# Patient Record
Sex: MALE | Race: WHITE
[De-identification: names, ages, dates, MRNs, and addresses within clinical notes are randomized per-mention and may not be internally consistent; named-entity substitution may affect disease eponyms.]

---

## 2020-03-08 ENCOUNTER — HOSPITAL ENCOUNTER (EMERGENCY)
Dept: HOSPITAL 62 - ER | Age: 20
LOS: 1 days | Discharge: HOME | End: 2020-03-09
Payer: COMMERCIAL

## 2020-03-08 DIAGNOSIS — D72.829: ICD-10-CM

## 2020-03-08 DIAGNOSIS — R10.84: Primary | ICD-10-CM

## 2020-03-08 DIAGNOSIS — Z87.891: ICD-10-CM

## 2020-03-08 LAB
ADD MANUAL DIFF: NO
ALBUMIN SERPL-MCNC: 5.3 G/DL (ref 3.5–5)
ALP SERPL-CCNC: 108 U/L (ref 38–126)
ANION GAP SERPL CALC-SCNC: 12 MMOL/L (ref 5–19)
APPEARANCE UR: CLEAR
APTT PPP: YELLOW S
AST SERPL-CCNC: 39 U/L (ref 17–59)
BASOPHILS # BLD AUTO: 0.1 10^3/UL (ref 0–0.2)
BASOPHILS NFR BLD AUTO: 0.4 % (ref 0–2)
BILIRUB DIRECT SERPL-MCNC: 0 MG/DL (ref 0–0.4)
BILIRUB SERPL-MCNC: 3 MG/DL (ref 0.2–1.3)
BILIRUB UR QL STRIP: NEGATIVE
BUN SERPL-MCNC: 15 MG/DL (ref 7–20)
CALCIUM: 9.8 MG/DL (ref 8.4–10.2)
CHLORIDE SERPL-SCNC: 101 MMOL/L (ref 98–107)
CO2 SERPL-SCNC: 27 MMOL/L (ref 22–30)
EOSINOPHIL # BLD AUTO: 0 10^3/UL (ref 0–0.6)
EOSINOPHIL NFR BLD AUTO: 0.2 % (ref 0–6)
ERYTHROCYTE [DISTWIDTH] IN BLOOD BY AUTOMATED COUNT: 13.1 % (ref 11.5–14)
GLUCOSE SERPL-MCNC: 84 MG/DL (ref 75–110)
GLUCOSE UR STRIP-MCNC: NEGATIVE MG/DL
HCT VFR BLD CALC: 43.6 % (ref 37.9–51)
HGB BLD-MCNC: 15.7 G/DL (ref 13.5–17)
KETONES UR STRIP-MCNC: 20 MG/DL
LYMPHOCYTES # BLD AUTO: 1.8 10^3/UL (ref 0.5–4.7)
LYMPHOCYTES NFR BLD AUTO: 13.8 % (ref 13–45)
MCH RBC QN AUTO: 29.6 PG (ref 27–33.4)
MCHC RBC AUTO-ENTMCNC: 36 G/DL (ref 32–36)
MCV RBC AUTO: 82 FL (ref 80–97)
MONOCYTES # BLD AUTO: 0.8 10^3/UL (ref 0.1–1.4)
MONOCYTES NFR BLD AUTO: 5.8 % (ref 3–13)
NEUTROPHILS # BLD AUTO: 10.5 10^3/UL (ref 1.7–8.2)
NEUTS SEG NFR BLD AUTO: 79.8 % (ref 42–78)
PH UR STRIP: 7 [PH] (ref 5–9)
PLATELET # BLD: 204 10^3/UL (ref 150–450)
POTASSIUM SERPL-SCNC: 3.9 MMOL/L (ref 3.6–5)
PROT SERPL-MCNC: 8.4 G/DL (ref 6.3–8.2)
PROT UR STRIP-MCNC: NEGATIVE MG/DL
RBC # BLD AUTO: 5.3 10^6/UL (ref 4.35–5.55)
SP GR UR STRIP: 1.02
TOTAL CELLS COUNTED % (AUTO): 100 %
UROBILINOGEN UR-MCNC: NEGATIVE MG/DL (ref ?–2)
WBC # BLD AUTO: 13.2 10^3/UL (ref 4–10.5)

## 2020-03-08 PROCEDURE — 83690 ASSAY OF LIPASE: CPT

## 2020-03-08 PROCEDURE — 85025 COMPLETE CBC W/AUTO DIFF WBC: CPT

## 2020-03-08 PROCEDURE — 81001 URINALYSIS AUTO W/SCOPE: CPT

## 2020-03-08 PROCEDURE — 36415 COLL VENOUS BLD VENIPUNCTURE: CPT

## 2020-03-08 PROCEDURE — 80053 COMPREHEN METABOLIC PANEL: CPT

## 2020-03-08 PROCEDURE — 99284 EMERGENCY DEPT VISIT MOD MDM: CPT

## 2020-03-08 NOTE — ER DOCUMENT REPORT
ED Medical Screen (RME)





- General


Chief Complaint: Abdominal Pain


Stated Complaint: ABDOMINAL PAIN


Time Seen by Provider: 03/08/20 20:58


TRAVEL OUTSIDE OF THE [place] IN LAST 30 DAYS: No





- HPI


Notes: 





03/08/20 21:02


Patient is a 20-year-old male no significant past medical history who presents 

complaining of mid abdominal pain that began this evening when he was at work.  

Patient is not aware of anything that worsens or improves the symptoms.  It is 

described as a sharp pain.  Patient states that the pain does not radiate aside 

from to his middle abdomen.  He is urinating normally and having normal bowel 

movements.  No fever, chest pain, shortness of breath, vomiting, diarrhea.





I have treated and performed a rapid initial assessment of this patient.  A 

comprehensive ED assessment and evaluation of the patient, analysis of test 

results and completion of medical decision making process will be conducted by 

additional ED providers.





PHYSICAL EXAMINATION:





GENERAL: Well-appearing, well-nourished and in no acute distress.  A&Ox4.  

Answers questions appropriately.


Abd: Limited exam in triage.  Grossly nontender.  Soft throughout.





- Related Data


Allergies/Adverse Reactions: 


                                        





No Known Allergies Allergy (Verified 03/08/20 20:51)


   











Past Medical History





- Social History


Chew tobacco use (# tins/day): No


Frequency of alcohol use: Occasional


Drug Abuse: None





Physical Exam





- Vital signs


Vitals: 





                                        











Temp Pulse Resp BP Pulse Ox


 


 98.4 F   83   20   146/96 H  100 


 


 03/08/20 20:24  03/08/20 20:24  03/08/20 20:24  03/08/20 20:24  03/08/20 20:24














Course





- Vital Signs


Vital signs: 





                                        











Temp Pulse Resp BP Pulse Ox


 


 98.4 F   83   20   146/96 H  100 


 


 03/08/20 20:24  03/08/20 20:24  03/08/20 20:24  03/08/20 20:24  03/08/20 20:24

## 2020-03-08 NOTE — ER DOCUMENT REPORT
ED GI/





- General


Chief Complaint: Abdominal Pain


Stated Complaint: ABDOMINAL PAIN


Time Seen by Provider: 03/08/20 20:58


Notes: 


Patient is a 20-year-old male that comes emergency department for chief 

complaint of mid abdominal pain.  He states symptoms started earlier today, he 

states that he was bent over working and he suddenly stood up, he states when he

stood up he felt a very sharp pain in his mid abdomen.  He states the pain 

continued so he came in.  He states pain is almost completely subsided now 

really hurts.  He points just below the umbilicus for the site of the pain.  He 

denies specific injury but he does admit that he was attempting with a group to 

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  He 

denies abnormal bowel movements, had a normal bowel movement this morning, 

denies vomiting, fever/chills, flank pain, or any other complaints.  He denies 

genital pain.  He denies any surgeries or daily medications.  Family at bedside.





TRAVEL OUTSIDE OF THE [place] IN LAST 30 DAYS: No





- Related Data


Allergies/Adverse Reactions: 


                                        





No Known Allergies Allergy (Verified 03/08/20 20:51)


   











Past Medical History





- General


Information source: Patient





- Social History


Smoking Status: Former Smoker


Chew tobacco use (# tins/day): No


Frequency of alcohol use: Occasional


Drug Abuse: None


Lives with: Family


Family History: Reviewed & Not Pertinent


Patient has suicidal ideation: No


Patient has homicidal ideation: No


Surgical Hx: Negative





- Immunizations


Immunizations up to date: Yes


Hx Diphtheria, Pertussis, Tetanus Vaccination: Yes





Review of Systems





- Review of Systems


Constitutional: No symptoms reported


EENT: No symptoms reported


Cardiovascular: No symptoms reported


Respiratory: No symptoms reported


Gastrointestinal: See HPI


Genitourinary: No symptoms reported


Male Genitourinary: No symptoms reported


Musculoskeletal: See HPI


Skin: No symptoms reported


Hematologic/Lymphatic: No symptoms reported


Neurological/Psychological: No symptoms reported





Physical Exam





- Vital signs


Vitals: 


                                        











Temp Pulse Resp BP Pulse Ox


 


 98.4 F   83   20   146/96 H  100 


 


 03/08/20 20:24  03/08/20 20:24  03/08/20 20:24  03/08/20 20:24  03/08/20 20:24














- Notes


Notes: 





GENERAL: Alert, interacts well. No acute distress.


HEAD: Normocephalic, atraumatic.


EYES: Pupils equal, round, and reactive to light. Extraocular movements intact.


ENT: Oral mucosa moist, tongue midline. Oropharynx unremarkable. Airway patent. 


LUNGS: Clear to auscultation bilaterally, no wheezes, rales, or rhonchi. No 

respiratory distress.


HEART: Regular rate and rhythm. No murmur


ABDOMEN: Abdomen is actually soft and nontender.  There is no guarding or 

rigidity.  McBurney's point is nontender.  Bowel sounds present.  No signs of 

trauma.


EXTREMITIES: Moves all 4 extremities spontaneously. No edema, normal radial and 

dorsalis pedis pulses bilaterally. No cyanosis.


BACK: no cervical, thoracic, lumbar midline tenderness. No saddle anesthesia, 

normal distal neurovascular exam. Moves all extremities in full range of motion.


NEUROLOGICAL: Alert and oriented x3. Normal speech. Cranial nerves II through 

XII grossly intact. 


PSYCH: Normal affect, normal mood.


SKIN: Warm, dry, normal turgor. No rashes or lesions noted.





Course





- Re-evaluation


Re-evalutation: 


CBC shows mild leukocytosis without bandemia.  Chemistry shows elevated 

proteins, probably by concentration.  Urinalysis shows elevated specific gravity

and some ketones.  No blood in the urine.  Patient has a surprisingly benign 

abdomen, his symptoms have significantly improved here, and he has a history 

suggesting that this is musculoskeletal in nature.  Because he has no McBurney's

point tenderness and no tenderness on my abdominal exam I do have a very low 

suspicion of acute abdomen.  As result I do not feel a CAT scan is indicated.  I

did discuss this at length with patient and family.  After discussing different 

options decision was made for time to be the next test, patient was given IV 

fluids, discussed expectations, follow-up, and return precautions in detail.  

Patient and family state appreciation and agreement.  Stable and well-appearing 

at time of discharge.





- Vital Signs


Vital signs: 


                                        











Temp Pulse Resp BP Pulse Ox


 


 98.6 F   60   18   150/82 H  100 


 


 03/09/20 00:35  03/09/20 00:35  03/09/20 00:35  03/09/20 00:35  03/09/20 00:35














- Laboratory


Result Diagrams: 


                                 03/08/20 21:14





                                 03/08/20 21:14


Laboratory results interpreted by me: 


                                        











  03/08/20 03/08/20 03/08/20





  21:14 21:14 21:14


 


WBC  13.2 H  


 


Absolute Neuts (auto)  10.5 H  


 


Seg Neutrophils %  79.8 H  


 


Total Bilirubin   3.0 H 


 


Total Protein   8.4 H 


 


Albumin   5.3 H 


 


Urine Ketones    20 H














Discharge





- Discharge


Clinical Impression: 


Abdominal pain


Qualifiers:


 Abdominal location: generalized Qualified Code(s): R10.84 - Generalized 

abdominal pain





Condition: Stable


Disposition: HOME, SELF-CARE


Additional Instructions: 


Your work-up does show dehydration, but your exam and overall evaluation are 

reassuring.


The pain is most likely either muscular or from your bowel.  At this time I have

low suspicion of appendicitis or a surgical abnormality.


You most likely have some soreness and intermittent and even sharp pain over the

next day or 2 but this should gradually increase with time.  You can take 

over-the-counter anti-inflammatories, hydrate, and rest.  You can also apply 

heat over your abdomen.





There is still a possibility that this develops into a concerning and even 

surgical problem, if you begin vomiting, the pain becomes progressively worse 

and localizes to one area, or if you develop a fever you need to return to the 

emergency department.

## 2020-03-09 VITALS — DIASTOLIC BLOOD PRESSURE: 82 MMHG | SYSTOLIC BLOOD PRESSURE: 150 MMHG
